# Patient Record
Sex: MALE | URBAN - METROPOLITAN AREA
[De-identification: names, ages, dates, MRNs, and addresses within clinical notes are randomized per-mention and may not be internally consistent; named-entity substitution may affect disease eponyms.]

---

## 2021-08-18 ENCOUNTER — TELEPHONE (OUTPATIENT)
Dept: PSYCHIATRY | Facility: CLINIC | Age: 26
End: 2021-08-18

## 2024-09-19 ENCOUNTER — OCCUPATIONAL MEDICINE (OUTPATIENT)
Dept: URGENT CARE | Facility: PHYSICIAN GROUP | Age: 29
End: 2024-09-19
Payer: COMMERCIAL

## 2024-09-19 VITALS
WEIGHT: 199.41 LBS | HEIGHT: 72 IN | SYSTOLIC BLOOD PRESSURE: 118 MMHG | OXYGEN SATURATION: 98 % | TEMPERATURE: 97.7 F | BODY MASS INDEX: 27.01 KG/M2 | HEART RATE: 93 BPM | DIASTOLIC BLOOD PRESSURE: 72 MMHG | RESPIRATION RATE: 20 BRPM

## 2024-09-19 DIAGNOSIS — S06.0X0A CONCUSSION WITHOUT LOSS OF CONSCIOUSNESS, INITIAL ENCOUNTER: ICD-10-CM

## 2024-09-19 DIAGNOSIS — S09.90XA CLOSED HEAD INJURY, INITIAL ENCOUNTER: ICD-10-CM

## 2024-09-19 PROCEDURE — 3074F SYST BP LT 130 MM HG: CPT | Performed by: PHYSICIAN ASSISTANT

## 2024-09-19 PROCEDURE — 3078F DIAST BP <80 MM HG: CPT | Performed by: PHYSICIAN ASSISTANT

## 2024-09-19 PROCEDURE — 99203 OFFICE O/P NEW LOW 30 MIN: CPT | Performed by: PHYSICIAN ASSISTANT

## 2024-09-19 RX ORDER — PROCHLORPERAZINE MALEATE 5 MG
5 TABLET ORAL EVERY 6 HOURS PRN
COMMUNITY

## 2024-09-19 ASSESSMENT — ENCOUNTER SYMPTOMS
NAUSEA: 1
DIZZINESS: 1
HEADACHES: 1
DOUBLE VISION: 1
CHILLS: 0
LOSS OF CONSCIOUSNESS: 0
FEVER: 0
BLURRED VISION: 1
VOMITING: 0
BRUISES/BLEEDS EASILY: 0

## 2024-09-19 NOTE — LETTER
EMPLOYEE’S CLAIM FOR COMPENSATION/ REPORT OF INITIAL TREATMENT  FORM C-4  PLEASE TYPE OR PRINT    EMPLOYEE’S CLAIM - PROVIDE ALL INFORMATION REQUESTED   First Name                    ANDRÉS Strickland                  Last Name  Percy Mark Birthdate                    1995                Sex  Male Claim Number (Insurer’s Use Only)     Home Address  [address] Age  29 y.o. Height  1.829 m (6') Weight  90.5 kg (199 lb 6.5 oz) Social Security Number     [facility] [place]  [address] Telephone  [phone] (home)    Mailing Address  [address] [facility]  [address] Primary Language Spoken  English    INSURER   THIRD-PARTY   [employer]   Employee's Occupation (Job Title) When Injury or Occupational Disease Occurred      Employer's Name/Company Name  [employer]  Telephone  [phone]    Office Mail Address (Number and Street)  [address]     Date of Injury (if applicable) 9/18/2024               Hours Injury (if applicable)  2:00 PM Date Employer Notified  9/18/2024 Last Day of Work after Injury or Occupational Disease  9/18/2024 Supervisor to Whom Injury Reported  Zackery Castellanos   Address or Location of Accident (if applicable)  Work [1]   What were you doing at the time of accident? (if applicable)  walking    How did this injury or occupational disease occur? (Be specific and answer in detail. Use additional sheet if necessary)  I was trying to make it to a piece of equipment the walkway was obstructed by 1250lb  water tote. I had to crawl under another piece of equipment and hit my head as a result. The same thing happened to a colleague on 2nd shift same day   If you believe that you have an occupational disease, when did you first have knowledge of the disability and its relationship to your employment?  n/a Witnesses to the Accident (if  applicable)  Andrew Reyes      Nature of Injury or Occupational Disease  Workers' Compensation  Part(s) of Body Injured or Affected  Skull      I CERTIFY THAT THE ABOVE IS TRUE AND CORRECT TO T HE BEST OF MY KNOWLEDGE AND THAT I HAVE PROVIDED THIS INFORMATION IN ORDER TO OBTAIN THE BENEFITS OF [place], OR [place]  I HEREBY AUTHORIZE ANY PHYSICIAN, CHIROPRACTOR, SURGEON, PRACTITIONER OR ANY OTHER PERSON, ANY HOSPITAL, INCLUDING [facility] OR [facility], ANY  MEDICAL SERVICE ORGANIZATION, ANY INSURANCE COMPANY, OR OTHER INSTITUTION OR ORGANIZATION TO RELEASE TO EACH OTHER, ANY MEDICAL OR OTHER INFORMATION, INCLUDING BENEFITS PAID OR PAYABLE, PERTINENT TO THIS INJURY OR DISEASE, EXCEPT INFORMATION RELATIVE TO DIAGNOSIS, TREATMENT AND/OR COUNSELING FOR AIDS, PSYCHOLOGICAL CONDITIONS, ALCOHOL OR CONTROLLED SUBSTANCES, FOR WHICH I MUST GIVE SPECIFIC AUTHORIZATION.  A PHOTOSTAT OF THIS AUTHORIZATION SHALL BE VALID AS THE ORIGINAL.     Date   Place Employee’s Original or  *Electronic Signature   THIS REPORT MUST BE COMPLETED AND MAILED WITHIN 3 WORKING DAYS OF TREATMENT   Place  [facility]    Name of Facility  [place]   Date 9/19/2024 Diagnosis and Description of Injury or Occupational Disease  (S09.90XA) Closed head injury, initial encounter  (S06.0X0A) Concussion without loss of consciousness, initial encounter  Diagnoses of Closed head injury, initial encounter and Concussion without loss of consciousness, initial encounter were pertinent to this visit. Is there evidence that the injured employee was under the influence of alcohol and/or another controlled substance at the time of accident?  []No  [] Yes (if yes, please explain)   Hour 1:48 PM  No   Treatment: Symptoms are consistent with mild concussion.  Patient will be TTD for the next 2 shifts.  He should limit his screen time and  any activity that he needs to be hyperfocused on.  He should also avoid any strenuous physical activity.  Rest, drink fluids, can use OTC analgesics to help with pain.  Will see him back on Sunday for reevaluation.  D39 and C4 completed.    Have you advised the patient to remain off work five days or more?   [] Yes Indicate dates: From   To    [] No      If no, is the injured employee capable of: [] full duty [] modified duty                     If modified duty, specify any limitations / restrictions:                                                                                                                                                                                                                                                                                                                                                                                                                  X-Ray Findings:      From information given by the employee, together with medical evidence, can you directly connect this injury or occupational disease as job incurred?  []Yes   [] No Yes    Is additional medical care by a physician indicated? []Yes [] No  Yes    Do you know of any previous injury or disease contributing to this condition or occupational disease? []Yes [] No (Explain if yes)                          No   Date  9/19/2024 Print Health Care Provider’s Name  Vj Lainez P.A.-C. I certify that the employer’s copy of  this form was delivered to the employer on:   Address  [address]  [facility] INSURER'S USE ONLY                       [facility]  [address] Provider’s Tax ID Number  [id number]   Telephone  Dept: [phone]    Health Care Provider’s Original or Electronic Signature  e-VJ Lafleur P.A.-C. Degree (MD,DO, DC,PAMaryC,APRN)  PAALISHA  Choose (if applicable)      ORIGINAL - TREATING HEALTHCARE PROVIDER PAGE 2 - INSURER/TPA PAGE 3 - EMPLOYER PAGE 4 - EMPLOYEE             Form  C-4 (rev.08/23)

## 2024-09-19 NOTE — PROGRESS NOTES
"Subjective     Em Sanderson is a 29 y.o. male who presents with Head Injury ( DOI [id number] ducked under a machine and hit top of head.  Dizzy ,confused last night ,brain feels slow now.  )      DOI: 9/18/2024    Em Sanderson is a 29 y.o. male who presents today for evaluation of a head injury.  Patient reports that he was trying to go under a piece of equipment that was occluding his walkway yesterday.  He hit the top of his head while doing this.  Did not lose consciousness.  States that he felt okay for the remainder of his shift but when he was walking his dogs at night he started to feel \"woozy\" and had to stop to walk which is some that is never happened to him before.  He had a hard time focusing, felt fatigued, dizzy, and has had some mild blurred vision since that time.  No vomiting or focal weakness.  No history of head injuries.  He is not on blood thinners.        Review of Systems   Constitutional:  Positive for malaise/fatigue. Negative for chills and fever.   Eyes:  Positive for blurred vision and double vision.   Gastrointestinal:  Positive for nausea. Negative for vomiting.   Neurological:  Positive for dizziness and headaches. Negative for loss of consciousness.   Endo/Heme/Allergies:  Does not bruise/bleed easily.           PMH: No pertinent past medical history to this problem  MEDS: Medications were reviewed in Epic  ALLERGIES: Allergies were reviewed in Epic  [employer]CH[employer]: Works at [employer]   FH: No pertinent family history to this problem      Objective     /72   Pulse 93   Temp 36.5 °C (97.7 °F) (Temporal)   Resp 20   Ht 1.829 m (6')   Wt 90.5 kg (199 lb 6.5 oz)   SpO2 98%   BMI 27.04 kg/m²      Physical Exam  Constitutional:       General: He is not in acute distress.     Appearance: He is not diaphoretic.   HENT:      Head: Normocephalic and atraumatic.      Comments: Small contusion with tenderness on the top of his head.  No lacerations or hematomas noted.     Right " Ear: External ear normal.      Left Ear: External ear normal.   Eyes:      Extraocular Movements: Extraocular movements intact.      Conjunctiva/sclera: Conjunctivae normal.      Pupils: Pupils are equal, round, and reactive to light.   Pulmonary:      Effort: Pulmonary effort is normal. No respiratory distress.   Musculoskeletal:      Cervical back: Normal range of motion.   Skin:     Findings: No rash.   Neurological:      General: No focal deficit present.      Mental Status: He is alert and oriented to person, place, and time.      GCS: GCS eye subscore is 4. GCS verbal subscore is 5. GCS motor subscore is 6.      Cranial Nerves: No facial asymmetry.      Motor: No pronator drift.      Coordination: Romberg sign negative. Heel to Shin Test normal. Rapid alternating movements normal.      Gait: Gait normal.      Deep Tendon Reflexes: Reflexes are normal and symmetric.   Psychiatric:         Mood and Affect: Mood and affect normal.         Cognition and Memory: Memory normal.         Judgment: Judgment normal.             Assessment & Plan     1. Closed head injury, initial encounter    2. Concussion without loss of consciousness, initial encounter    Symptoms are consistent with mild concussion.  Patient will be TTD for the next 2 shifts.  He should limit his screen time and any activity that he needs to be hyperfocused on.  He should also avoid any strenuous physical activity.  Rest, drink fluids, can use OTC analgesics to help with pain.  Will see him back on Sunday for reevaluation.  D39 and C4 completed.

## 2024-09-19 NOTE — LETTER
"PHYSICIAN’S AND CHIROPRACTIC PHYSICIAN'S   PROGRESS REPORT CERTIFICATION OF DISABILITY Claim Number:     Social Security Number:    Patient’s Name: Em Sanderson Date of Injury: 9/18/2024   Employer: [employer] Name of MCO (if applicable):      Patient’s Job Description/Occupation:        Previous Injuries/Diseases/Surgeries Contributing to the Condition:  No      Diagnosis: (S09.90XA) Closed head injury, initial encounter  (S06.0X0A) Concussion without loss of consciousness, initial encounter      Related to the Industrial Injury? Yes     Explain: DOI: 9/18/2024    Em Sanderson is a 29 y.o. male who presents today for evaluation of a head injury.  Patient reports that he was trying to go under a piece of equipment that was occluding his walkway yesterday.  He hit the top of his head while doing this.  Did not lose consciousness.  States that he felt okay for the remainder of his shift but when he was walking his dogs at night he started to feel \"woozy\" and had to stop to walk which is some that is never happened to him before.  He had a hard time focusing, felt fatigued, dizzy, and has had some mild blurred vision since that time.  No vomiting or focal weakness.  No history of head injuries.  He is not on blood thinners.        Objective Medical Findings:   Constitutional:       General: He is not in acute distress.  HENT:      Comments: Small contusion with tenderness on the top of his head.  No lacerations or hematomas noted.  Eyes:      Extraocular Movements: Extraocular movements intact.      Conjunctiva/sclera: Conjunctivae normal.      Pupils: Pupils are equal, round, and reactive to light.   Musculoskeletal:      Cervical back: Normal range of motion.   Neurological:      General: No focal deficit present.      Mental Status: He is alert and oriented to person, place, and time.      GCS: GCS eye subscore is 4. GCS verbal subscore is 5. GCS motor subscore is 6.      Cranial Nerves: No " facial asymmetry.      Motor: No pronator drift.      Coordination: Romberg sign negative. Heel to Shin Test normal. Rapid alternating movements normal.      Gait: Gait normal.      Deep Tendon Reflexes: Reflexes are normal and symmetric.   Psychiatric:         Mood and Affect: Mood and affect normal.         Cognition and Memory: Memory normal.         Judgment: Judgment normal.            None - Discharged                         Stable  No                 Ratable  No        Generally Improved                         Condition Worsened               X   Condition Same  May Have Suffered a Permanent Disability No     Treatment Plan:    Symptoms are consistent with mild concussion.  Patient will be TTD for the next 2 shifts.  He should limit his screen time and any activity that he needs to be hyperfocused on.  He should also avoid any strenuous physical activity.  Rest, drink fluids, can use OTC analgesics to help with pain.  Will see him back on Sunday for reevaluation.  D39 and C4 completed.         No Change in Therapy                  PT/OT Prescribed                      Medication May be Used While Working        Case Management                          PT/OT Discontinued    Consultation    Further Diagnostic Studies:    Prescription(s)                 Released to FULL DUTY /No Restrictions on (Date):  From:    X  Certified TOTALLY TEMPORARILY DISABLED (Indicate Dates) From: 9/19/2024 To: 9/22/2024    Released to RESTRICTED/Modified Duty on (Date): From:   To:    Restrictions Are:         No Sitting    No Standing    No Pulling Other:         No Bending at Waist     No Stooping     No Lifting        No Carrying     No Walking Lifting Restricted to (lbs.):          No Pushing        No Climbing     No Reaching Above Shoulders       Date of Next Visit:  9/22/2024  @11:00 am Date of this Exam: 9/19/2024 Physician/Chiropractic Physician Name: Geraldine Lainez P.A.-C. Physician/Chiropractic Physician  Signature:  Jeremías Still DO MPH                                                                                                                                                                                                            D-39 (Rev. 2/24)

## 2024-09-19 NOTE — LETTER
"PHYSICIAN’S AND CHIROPRACTIC PHYSICIAN'S   PROGRESS REPORT CERTIFICATION OF DISABILITY Claim Number:     Social Security Number:    Patient’s Name: Em Sanderson Date of Injury: 9/18/2024   Employer: [employer] Name of MCO (if applicable):      Patient’s Job Description/Occupation:        Previous Injuries/Diseases/Surgeries Contributing to the Condition:  No      Diagnosis: (S09.90XA) Closed head injury, initial encounter  (S06.0X0A) Concussion without loss of consciousness, initial encounter      Related to the Industrial Injury? Yes     Explain: DOI: 9/18/2024    Em Sanderson is a 29 y.o. male who presents today for evaluation of a head injury.  Patient reports that he was trying to go under a piece of comment that was occluding his walkway yesterday.  He hit the top of his head while doing this.  Did not lose consciousness.  States that he felt okay for the remainder of his shift but when he was walking his dogs at night he started to feel \"woozy\" and had to stop to walk which is some that is never happened to him before.  He had a hard time focusing, felt fatigued, dizzy, and has had some mild blurred vision since that time.  No vomiting or focal weakness.  No history of head injuries.  He is not on blood thinners.        Objective Medical Findings:   Constitutional:       General: He is not in acute distress.  HENT:      Comments: Small contusion with tenderness on the top of his head.  No lacerations or hematomas noted.  Eyes:      Extraocular Movements: Extraocular movements intact.      Conjunctiva/sclera: Conjunctivae normal.      Pupils: Pupils are equal, round, and reactive to light.   Musculoskeletal:      Cervical back: Normal range of motion.   Neurological:      General: No focal deficit present.      Mental Status: He is alert and oriented to person, place, and time.      GCS: GCS eye subscore is 4. GCS verbal subscore is 5. GCS motor subscore is 6.      Cranial Nerves: No " facial asymmetry.      Motor: No pronator drift.      Coordination: Romberg sign negative. Heel to Shin Test normal. Rapid alternating movements normal.      Gait: Gait normal.      Deep Tendon Reflexes: Reflexes are normal and symmetric.   Psychiatric:         Mood and Affect: Mood and affect normal.         Cognition and Memory: Memory normal.         Judgment: Judgment normal.            None - Discharged                         Stable  No                 Ratable  No        Generally Improved                         Condition Worsened               X   Condition Same  May Have Suffered a Permanent Disability No     Treatment Plan:    Symptoms are consistent with mild concussion.  Patient will be TTD for the next 2 shifts.  He should limit his screen time and any activity that he needs to be hyperfocused on.  He should also avoid any strenuous physical activity.  Rest, drink fluids, can use OTC analgesics to help with pain.  Will see him back on Sunday for reevaluation.  D39 and C4 completed.         No Change in Therapy                  PT/OT Prescribed                      Medication May be Used While Working        Case Management                          PT/OT Discontinued    Consultation    Further Diagnostic Studies:    Prescription(s)                 Released to FULL DUTY /No Restrictions on (Date):  From:    X  Certified TOTALLY TEMPORARILY DISABLED (Indicate Dates) From: 9/19/2024 To: 9/22/2024    Released to RESTRICTED/Modified Duty on (Date): From:   To:    Restrictions Are:         No Sitting    No Standing    No Pulling Other:         No Bending at Waist     No Stooping     No Lifting        No Carrying     No Walking Lifting Restricted to (lbs.):          No Pushing        No Climbing     No Reaching Above Shoulders       Date of Next Visit:  9/22/2024  @11:00 am Date of this Exam: 9/19/2024 Physician/Chiropractic Physician Name: Geraldine Lainez P.A.-C. Physician/Chiropractic Physician  Signature:  Jeremías Still DO MPH                                                                                                                                                                                                            D-39 (Rev. 2/24)

## 2024-09-22 ENCOUNTER — APPOINTMENT (OUTPATIENT)
Dept: URGENT CARE | Facility: PHYSICIAN GROUP | Age: 29
End: 2024-09-22